# Patient Record
Sex: MALE | Race: OTHER | NOT HISPANIC OR LATINO | Employment: FULL TIME | URBAN - METROPOLITAN AREA
[De-identification: names, ages, dates, MRNs, and addresses within clinical notes are randomized per-mention and may not be internally consistent; named-entity substitution may affect disease eponyms.]

---

## 2022-03-16 ENCOUNTER — OFFICE VISIT (OUTPATIENT)
Dept: OPTOMETRY | Facility: CLINIC | Age: 47
End: 2022-03-16
Payer: COMMERCIAL

## 2022-03-16 DIAGNOSIS — H40.013 OPEN ANGLE WITH BORDERLINE FINDINGS, LOW RISK, BILATERAL: Primary | ICD-10-CM

## 2022-03-16 PROCEDURE — 92004 PR EYE EXAM, NEW PATIENT,COMPREHESV: ICD-10-PCS | Mod: S$GLB,,, | Performed by: OPTOMETRIST

## 2022-03-16 PROCEDURE — 99999 PR PBB SHADOW E&M-NEW PATIENT-LVL II: ICD-10-PCS | Mod: PBBFAC,,, | Performed by: OPTOMETRIST

## 2022-03-16 PROCEDURE — 99999 PR PBB SHADOW E&M-NEW PATIENT-LVL II: CPT | Mod: PBBFAC,,, | Performed by: OPTOMETRIST

## 2022-03-16 PROCEDURE — 92004 COMPRE OPH EXAM NEW PT 1/>: CPT | Mod: S$GLB,,, | Performed by: OPTOMETRIST

## 2022-03-16 PROCEDURE — 92020 PR SPECIAL EYE EVAL,GONIOSCOPY: ICD-10-PCS | Mod: S$GLB,,, | Performed by: OPTOMETRIST

## 2022-03-16 PROCEDURE — 92020 GONIOSCOPY: CPT | Mod: S$GLB,,, | Performed by: OPTOMETRIST

## 2022-03-16 NOTE — PROGRESS NOTES
"HPI     45 Y/o male is here for routine eye exam with C/o Glaucoma--saw outside   optometrist 2 weeks ago who did an eye exam, buut said he needed to be   'screened for glaucoma"  Pt has RCE recurring corneal erosion   Pt denies pain and discomfort   No f/f     Eye med:  deandre 128 ointment OU QD     Last edited by Kiran Lucio, OD on 3/16/2022  2:53 PM. (History)        ROS     Positive for: Eyes (RCE hx)    Negative for: Constitutional, Gastrointestinal, Neurological, Skin,   Genitourinary, Musculoskeletal, HENT, Endocrine, Cardiovascular,   Respiratory, Psychiatric, Allergic/Imm, Heme/Lymph    Last edited by Kiran Lucio, OD on 3/16/2022  2:53 PM. (History)        Assessment /Plan     For exam results, see Encounter Report.    Open angle with borderline findings, low risk, bilateral  -     Ramirez Visual Field - OU - Extended - Both Eyes; Future; Expected date: 04/16/2022  -     Posterior Segment OCT Optic Nerve- Both eyes; Future; Expected date: 04/16/2022      1. RCE Hx OD>OS sp trauma (had 'corrective procedure' in [place])--pt comfortable on DEANDRE 128  2. Pt presents TODAY because had routine exam at outside OD office 2 weeks ago, and was told needed glaucoma screening.  todays exam shows large CDs OU.  iop wnl without meds.  -fam hx.  Angles open by gonio OU.  Needs VF etc to ro glauc    PLAN:    HVF 24-2 sf/OCT.  appt w me after for iop ck/PACH/review                 "

## 2022-05-30 ENCOUNTER — OFFICE VISIT (OUTPATIENT)
Dept: OPTOMETRY | Facility: CLINIC | Age: 47
End: 2022-05-30
Payer: COMMERCIAL

## 2022-05-30 ENCOUNTER — CLINICAL SUPPORT (OUTPATIENT)
Dept: OPHTHALMOLOGY | Facility: CLINIC | Age: 47
End: 2022-05-30
Payer: COMMERCIAL

## 2022-05-30 DIAGNOSIS — H40.013 OPEN ANGLE WITH BORDERLINE FINDINGS, LOW RISK, BILATERAL: ICD-10-CM

## 2022-05-30 PROCEDURE — 76514 PR  US, EYE, FOR CORNEAL THICKNESS: ICD-10-PCS | Mod: S$GLB,,, | Performed by: OPTOMETRIST

## 2022-05-30 PROCEDURE — 99999 PR PBB SHADOW E&M-EST. PATIENT-LVL II: ICD-10-PCS | Mod: PBBFAC,,, | Performed by: OPTOMETRIST

## 2022-05-30 PROCEDURE — 92083 HUMPHREY VISUAL FIELD - OU - BOTH EYES: ICD-10-PCS | Mod: S$GLB,,, | Performed by: OPTOMETRIST

## 2022-05-30 PROCEDURE — 99999 PR PBB SHADOW E&M-EST. PATIENT-LVL II: CPT | Mod: PBBFAC,,, | Performed by: OPTOMETRIST

## 2022-05-30 PROCEDURE — 92083 EXTENDED VISUAL FIELD XM: CPT | Mod: S$GLB,,, | Performed by: OPTOMETRIST

## 2022-05-30 PROCEDURE — 76514 ECHO EXAM OF EYE THICKNESS: CPT | Mod: S$GLB,,, | Performed by: OPTOMETRIST

## 2022-05-30 PROCEDURE — 92012 INTRM OPH EXAM EST PATIENT: CPT | Mod: S$GLB,,, | Performed by: OPTOMETRIST

## 2022-05-30 PROCEDURE — 92133 POSTERIOR SEGMENT OCT OPTIC NERVE(OCULAR COHERENCE TOMOGRAPHY) - OU - BOTH EYES: ICD-10-PCS | Mod: S$GLB,,, | Performed by: OPTOMETRIST

## 2022-05-30 PROCEDURE — 92012 PR EYE EXAM, EST PATIENT,INTERMED: ICD-10-PCS | Mod: S$GLB,,, | Performed by: OPTOMETRIST

## 2022-05-30 PROCEDURE — 92133 CPTRZD OPH DX IMG PST SGM ON: CPT | Mod: S$GLB,,, | Performed by: OPTOMETRIST

## 2022-05-30 RX ORDER — FAMOTIDINE 20 MG/1
20 TABLET, FILM COATED ORAL
COMMUNITY

## 2022-05-30 RX ORDER — SODIUM CHLORIDE 5 %
OINTMENT (GRAM) OPHTHALMIC (EYE)
COMMUNITY

## 2022-05-30 NOTE — PROGRESS NOTES
"LISBETH Hobbs is a/an 47 y.o. male who returns,for continued eye care. He   was last seen by us on 03/16/2022 OAG suspect. He returns today for review   of his VF OCT and IOP check          Last edited by Kiran Lucio, OD on 5/30/2022 12:58 PM. (History)        ROS     Positive for: Eyes (RCE hx/glauc susp by CDs)    Negative for: Constitutional, Gastrointestinal, Neurological, Skin,   Genitourinary, Musculoskeletal, HENT, Endocrine, Cardiovascular,   Respiratory, Psychiatric, Allergic/Imm, Heme/Lymph    Last edited by Kiran Lucio, OD on 5/30/2022  9:30 AM. (History)        Assessment /Plan     For exam results, see Encounter Report.    Open angle with borderline findings, low risk, bilateral  -     Posterior Segment OCT Optic Nerve- Both eyes  -     Ramirez Visual Field - OU - Extended - Both Eyes  -     Ramirez Visual Field - OU - Extended - Both Eyes; Future; Expected date: 05/30/2023  -     Posterior Segment OCT Optic Nerve- Both eyes; Future; Expected date: 05/30/2023    see notes from 2 mos AGO:  1. RCE Hx OD>OS sp trauma (had 'corrective procedure' in [place]pt comfortable on JOYCE 128  2. Pt presents TODAY because had routine exam at outside OD office 2 weeks ago, and was told needed glaucoma screening.  todays exam shows large CDs OU.  iop wnl without meds.  -fam hx.  Angles open by gonio OU.  Needs VF etc to ro glauc    TODAY VF wnl OU.  OCT RNFL wnl OU.  iop wnl.  Pach: 571/599.  -fam hx (tho brother and niece also monitored for suspicious optic nerve).  Pt brought old notes from prev ophth which we will scan into system.  They were following for RCE, and noted Cds "0.3".  Still doubt glauc now--will monitor    PLAN:    rtc 1 yr: HVF 24-2 sf/OCT/full    PLAN:    HVF 24-2 sf/OCT.  appt w me after for iop ck/PACH/review                 "

## 2023-04-10 ENCOUNTER — TELEPHONE (OUTPATIENT)
Dept: OPTOMETRY | Facility: CLINIC | Age: 48
End: 2023-04-10
Payer: COMMERCIAL

## 2023-04-10 NOTE — TELEPHONE ENCOUNTER
----- Message from Selena Arrington sent at 4/10/2023 10:38 AM CDT -----  Contact: Zbigniew @[phone]  Pt needs to schedule his annual for rtc 1 yr: WALLACEF 24-2 sf/OCT/full but too big of time gap in between appts

## 2023-05-03 ENCOUNTER — TELEPHONE (OUTPATIENT)
Dept: OPTOMETRY | Facility: CLINIC | Age: 48
End: 2023-05-03
Payer: COMMERCIAL

## 2023-05-03 NOTE — TELEPHONE ENCOUNTER
----- Message from Shakira Haq sent at 5/3/2023  3:30 PM CDT -----  Regarding: Returning Missed Call  Contact: Pt  Pt is calling back from a missed call.   Pt. Would like a call back.          Confirmed contact below:   Contact Name:Zbigniew Hobbs  Phone Number: [phone]

## 2023-05-03 NOTE — TELEPHONE ENCOUNTER
----- Message from Lakeshia Hinkle sent at 5/3/2023 11:48 AM CDT -----  Regarding: appt access  Contact: self @ [phone]  Pt says he is returning Marika's call from 4-10-23 concerning appts.  Pls call.

## 2023-08-09 ENCOUNTER — OFFICE VISIT (OUTPATIENT)
Dept: OPTOMETRY | Facility: CLINIC | Age: 48
End: 2023-08-09
Payer: COMMERCIAL

## 2023-08-09 ENCOUNTER — CLINICAL SUPPORT (OUTPATIENT)
Dept: OPHTHALMOLOGY | Facility: CLINIC | Age: 48
End: 2023-08-09
Payer: COMMERCIAL

## 2023-08-09 DIAGNOSIS — H40.013 OPEN ANGLE WITH BORDERLINE FINDINGS, LOW RISK, BILATERAL: Primary | ICD-10-CM

## 2023-08-09 PROCEDURE — 92133 POSTERIOR SEGMENT OCT OPTIC NERVE(OCULAR COHERENCE TOMOGRAPHY) - OU - BOTH EYES: ICD-10-PCS | Mod: S$GLB,,, | Performed by: OPTOMETRIST

## 2023-08-09 PROCEDURE — 92083 EXTENDED VISUAL FIELD XM: CPT | Mod: S$GLB,,, | Performed by: OPTOMETRIST

## 2023-08-09 PROCEDURE — 1160F PR REVIEW ALL MEDS BY PRESCRIBER/CLIN PHARMACIST DOCUMENTED: ICD-10-PCS | Mod: CPTII,S$GLB,, | Performed by: OPTOMETRIST

## 2023-08-09 PROCEDURE — 92133 CPTRZD OPH DX IMG PST SGM ON: CPT | Mod: S$GLB,,, | Performed by: OPTOMETRIST

## 2023-08-09 PROCEDURE — 99999 PR PBB SHADOW E&M-EST. PATIENT-LVL II: CPT | Mod: PBBFAC,,, | Performed by: OPTOMETRIST

## 2023-08-09 PROCEDURE — 99999 PR PBB SHADOW E&M-EST. PATIENT-LVL II: ICD-10-PCS | Mod: PBBFAC,,, | Performed by: OPTOMETRIST

## 2023-08-09 PROCEDURE — 1159F PR MEDICATION LIST DOCUMENTED IN MEDICAL RECORD: ICD-10-PCS | Mod: CPTII,S$GLB,, | Performed by: OPTOMETRIST

## 2023-08-09 PROCEDURE — 1159F MED LIST DOCD IN RCRD: CPT | Mod: CPTII,S$GLB,, | Performed by: OPTOMETRIST

## 2023-08-09 PROCEDURE — 1160F RVW MEDS BY RX/DR IN RCRD: CPT | Mod: CPTII,S$GLB,, | Performed by: OPTOMETRIST

## 2023-08-09 PROCEDURE — 92012 INTRM OPH EXAM EST PATIENT: CPT | Mod: S$GLB,,, | Performed by: OPTOMETRIST

## 2023-08-09 PROCEDURE — 92012 PR EYE EXAM, EST PATIENT,INTERMED: ICD-10-PCS | Mod: S$GLB,,, | Performed by: OPTOMETRIST

## 2023-08-09 PROCEDURE — 92015 PR REFRACTION: ICD-10-PCS | Mod: S$GLB,,, | Performed by: OPTOMETRIST

## 2023-08-09 PROCEDURE — 92083 HUMPHREY VISUAL FIELD - OU - BOTH EYES: ICD-10-PCS | Mod: S$GLB,,, | Performed by: OPTOMETRIST

## 2023-08-09 PROCEDURE — 92015 DETERMINE REFRACTIVE STATE: CPT | Mod: S$GLB,,, | Performed by: OPTOMETRIST

## 2023-08-09 NOTE — PROGRESS NOTES
"HPI    49 Y/o male is here for routine eye exam with C/o pt state in the mornings   vision is blurry when looking up close and he say's his eye's feel tired   in the mornings also, pt also has sensitivity to light   Pt denies pain and discomfort   Floaters Occ     Eye med: Ti 128 5% OU QHS  Last edited by Celine Buenrostro MA on 8/9/2023  8:55 AM.            Assessment /Plan     For exam results, see Encounter Report.    Open angle with borderline findings, low risk, bilateral      1. RCE Hx OD>OS sp trauma (had 'corrective procedure' in [place])--pt comfortable on   2. Glauc susp by CDs (see OCT: RNFL wnl OU.  MRWA wnl OU) VF today wnl OU.    iop wnl.  Pach: 571/599.  -fam hx (tho brother and niece also monitored for suspicious optic nerve).  Pt brought old notes from prev ophth which we will scan into system.  They were following for RCE, and noted Cds "0.3".  Still doubt glauc now--will monitor  3. Pt wishes new Rx, and separate computer Rx  4. Some dry eye sx when awakens--advised SYS COMP ATs qAM/prn  5. Pt wishes to defer DFE today    PLAN:    rtc 1 yr: full exam.  Next HVF 24-2 sf/OCT 2 yrs (due 2025)                   "

## 2023-08-09 NOTE — PROGRESS NOTES
OCT/HVF done ou. Rel/fix/coop. Good ou./chart checked for latex allergy./ -3.25 + 1.00 x 15/od -3.25 + 1.00 x 180/os-h

## 2023-08-09 NOTE — PROGRESS NOTES
HPI    47 Y/o male is here for routine eye exam with C/o pt state in the mornings   vision is blurry when looking up close and he say's his eye's feel tired   in the mornings also, pt also has sensitivity to light   Pt denies pain and discomfort   Floaters Occ     Eye med: Ti 128 5% OU QHS  Last edited by Celine Buenrostro MA on 8/9/2023  8:55 AM.            Assessment /Plan     For exam results, see Encounter Report.    There are no diagnoses linked to this encounter.

## 2024-10-29 ENCOUNTER — OFFICE VISIT (OUTPATIENT)
Dept: PODIATRY | Facility: CLINIC | Age: 49
End: 2024-10-29
Payer: COMMERCIAL

## 2024-10-29 VITALS
BODY MASS INDEX: 28 KG/M2 | HEART RATE: 86 BPM | DIASTOLIC BLOOD PRESSURE: 67 MMHG | WEIGHT: 200 LBS | SYSTOLIC BLOOD PRESSURE: 118 MMHG | HEIGHT: 71 IN

## 2024-10-29 DIAGNOSIS — M24.573 EQUINUS CONTRACTURE OF ANKLE: ICD-10-CM

## 2024-10-29 DIAGNOSIS — M79.672 FOOT PAIN, BILATERAL: ICD-10-CM

## 2024-10-29 DIAGNOSIS — M79.671 FOOT PAIN, BILATERAL: ICD-10-CM

## 2024-10-29 DIAGNOSIS — M21.42 PES PLANUS OF BOTH FEET: ICD-10-CM

## 2024-10-29 DIAGNOSIS — M21.41 PES PLANUS OF BOTH FEET: ICD-10-CM

## 2024-10-29 DIAGNOSIS — M72.2 PLANTAR FASCIITIS: Primary | ICD-10-CM

## 2024-10-29 PROCEDURE — 99203 OFFICE O/P NEW LOW 30 MIN: CPT | Mod: S$GLB,,, | Performed by: PODIATRIST

## 2024-10-29 PROCEDURE — 1160F RVW MEDS BY RX/DR IN RCRD: CPT | Mod: CPTII,S$GLB,, | Performed by: PODIATRIST

## 2024-10-29 PROCEDURE — 3008F BODY MASS INDEX DOCD: CPT | Mod: CPTII,S$GLB,, | Performed by: PODIATRIST

## 2024-10-29 PROCEDURE — 1159F MED LIST DOCD IN RCRD: CPT | Mod: CPTII,S$GLB,, | Performed by: PODIATRIST

## 2024-10-29 PROCEDURE — 3074F SYST BP LT 130 MM HG: CPT | Mod: CPTII,S$GLB,, | Performed by: PODIATRIST

## 2024-10-29 PROCEDURE — 3078F DIAST BP <80 MM HG: CPT | Mod: CPTII,S$GLB,, | Performed by: PODIATRIST

## 2024-10-29 PROCEDURE — 99999 PR PBB SHADOW E&M-EST. PATIENT-LVL III: CPT | Mod: PBBFAC,,, | Performed by: PODIATRIST

## 2024-10-29 NOTE — PROGRESS NOTES
Subjective:     Patient ID: Zbigniew Hobbs is a 49 y.o. male.    Chief Complaint: Heel Pain (I have had heel pain for about three months at varying levels and more in the mornings.)    Patient presents to clinic for evaluation of bilateral foot pain right greater than left.  Patient states this pain has been going on for 3 months.  Patient states the pain is 0/10 right now, the whenever it does hurt can be a 5/10.  He states with his 1st step in the morning he notices the pain.  Patient has had this happened to him before, that was treated with orthotics.    Review of Systems   Constitutional: Negative for chills and fever.   Musculoskeletal:  Positive for myalgias.   Gastrointestinal:  Negative for constipation, diarrhea, nausea and vomiting.        Objective:     Physical Exam  Constitutional:       General: He is not in acute distress.     Appearance: Normal appearance. He is normal weight.   Cardiovascular:      Pulses:           Dorsalis pedis pulses are 2+ on the right side and 2+ on the left side.        Posterior tibial pulses are 2+ on the right side and 2+ on the left side.   Feet:      Comments: Tenderness to palpation of the b/l medial heel.  No open wounds or blisters.     Neurological:      Mental Status: He is oriented to person, place, and time.         Biomechanical Exam:     Non weight bearing assessment:   Right (degrees) Left (degrees)   Malleolar position 15-20 15-20   Ankle DF (knee extended) 6 6   Ankle DF (knee flexed) 10 10   Heel Inversion 18 18   Heel Eversion 12 12   STJ Neutral Position 0 0   Forefoot to rearfoot (1-5) perp perp   Forefoot to Rearfoot (2-5) perp perp   First Ray Dorsiflextion 5mm 5mm   First ray plantarflextion 5mm 5mm   First ray Neutral Position 0mm 0mm   Hallux Dorsiflexion 65 65   Hallux plantarflexion 30 30      Musculoskeletal evaluation, Range of Motion    Normal Limited Excessive pain   Ankle DF  R/L     Ankle PF R/L      STJ supination R/L      STJ pronation   R/L     Hallux DF R/L      Hallux PF R/L      Lesser digits DF R/L      Lesser Digits PF R/L        Muscle Strength   Right Left   Gastrocnemius 5 5   Soleus 5 5   Tib. Posterior 5 5   FDL 5 5   FHL 5 5   FDB 5 5   Tib Anterior 5 5   EDL 5 5   EHL 5 5   EDB 5 5   Peroneus Longus 5 5   Peroneus Brevis 5 5       Limb length Inequality (in cm)    Right Left   Normal  x x   structural     Combined     Functional       Digital Assessments    Right  1 2 3 4 5    Left  1 2 3 4 5      Abducted                   Adducted                   Clawtoe                   Hammer toe                   Mallet toetoe                   Hallux IP extensus     Hallux IP abductus         Gait Analysis  Gait Pattern: Normal      Right Left   Angle of Gait 8 8   Base of Gait 5cm 5cm       Heel Position Right Left   Contact sup sup   Mid-stance Pro Pro   Propulsion sup sup   Swing sup sup     Heel off: Early Both  Abductory twist?  No Both   Hubscher Maneuver? Recreates arch? Yes Both         Assessment:      Encounter Diagnoses   Name Primary?    Plantar fasciitis Yes    Foot pain, bilateral     Pes planus of both feet     Equinus contracture of ankle      Plan:     Zbigniew was seen today for heel pain.    Diagnoses and all orders for this visit:    Plantar fasciitis  -     ORTHOTIC DEVICE (DME)    Foot pain, bilateral    Pes planus of both feet    Equinus contracture of ankle      I counseled the patient on his conditions, their implications and medical management.    Advised on stretching ankle and achilles to increase ankle ROM, relieve pain, and decrease midfoot compensation. Patient was educated on calf stretching that will assist with mobility and ankle dorsiflexion; which in turn will decrease over-pronation of the foot, decrease forefoot pressure.     Advised on arch supporting orthotics to support arch, minimize hyperpronation of midfoot MTJ and STJ, and to relieve pain. Discussed arch support to supplement the medial longitudinal arch and  decrease excessive medial loading. Patient opting for custom orthotics, script written, and list of vendors dispensed.     Patient denied pain medications.    RTC 1 month for re-evaluation.

## 2024-10-30 ENCOUNTER — TELEPHONE (OUTPATIENT)
Dept: OPTOMETRY | Facility: CLINIC | Age: 49
End: 2024-10-30
Payer: COMMERCIAL

## 2024-11-01 ENCOUNTER — OFFICE VISIT (OUTPATIENT)
Dept: OPTOMETRY | Facility: CLINIC | Age: 49
End: 2024-11-01
Payer: COMMERCIAL

## 2024-11-01 DIAGNOSIS — E11.9 TYPE 2 DIABETES MELLITUS WITHOUT RETINOPATHY: ICD-10-CM

## 2024-11-01 DIAGNOSIS — H40.013 OPEN ANGLE WITH BORDERLINE FINDINGS, LOW RISK, BILATERAL: Primary | ICD-10-CM

## 2024-11-01 PROCEDURE — 99999 PR PBB SHADOW E&M-EST. PATIENT-LVL II: CPT | Mod: PBBFAC,,, | Performed by: OPTOMETRIST

## 2024-12-27 ENCOUNTER — PATIENT MESSAGE (OUTPATIENT)
Dept: OPTOMETRY | Facility: CLINIC | Age: 49
End: 2024-12-27
Payer: COMMERCIAL

## 2025-02-15 ENCOUNTER — PATIENT MESSAGE (OUTPATIENT)
Dept: PODIATRY | Facility: CLINIC | Age: 50
End: 2025-02-15
Payer: COMMERCIAL